# Patient Record
Sex: MALE | Race: OTHER | HISPANIC OR LATINO | URBAN - METROPOLITAN AREA
[De-identification: names, ages, dates, MRNs, and addresses within clinical notes are randomized per-mention and may not be internally consistent; named-entity substitution may affect disease eponyms.]

---
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
## 2023-05-05 NOTE — ED PROVIDER NOTE - QUALITY
Instructions: This plan will send the code FBSE to the PM system.  DO NOT or CHANGE the price. Detail Level: Simple Price (Do Not Change): 0.00 anxiety producing

## 2023-06-13 ENCOUNTER — EMERGENCY (EMERGENCY)
Facility: HOSPITAL | Age: 23
LOS: 1 days | Discharge: HOME | End: 2023-06-13
Attending: EMERGENCY MEDICINE
Payer: COMMERCIAL

## 2023-06-13 VITALS
HEART RATE: 86 BPM | HEIGHT: 66 IN | RESPIRATION RATE: 16 BRPM | OXYGEN SATURATION: 97 % | TEMPERATURE: 98 F | WEIGHT: 154.76 LBS | DIASTOLIC BLOOD PRESSURE: 61 MMHG | SYSTOLIC BLOOD PRESSURE: 112 MMHG

## 2023-06-13 PROCEDURE — 99284 EMERGENCY DEPT VISIT MOD MDM: CPT

## 2023-06-13 PROCEDURE — 90715 TDAP VACCINE 7 YRS/> IM: CPT

## 2023-06-13 PROCEDURE — 73140 X-RAY EXAM OF FINGER(S): CPT | Mod: 26,LT

## 2023-06-13 PROCEDURE — 99283 EMERGENCY DEPT VISIT LOW MDM: CPT | Mod: 25

## 2023-06-13 PROCEDURE — 90471 IMMUNIZATION ADMIN: CPT

## 2023-06-13 PROCEDURE — 73140 X-RAY EXAM OF FINGER(S): CPT

## 2023-06-13 RX ORDER — TETANUS TOXOID, REDUCED DIPHTHERIA TOXOID AND ACELLULAR PERTUSSIS VACCINE, ADSORBED 5; 2.5; 8; 8; 2.5 [IU]/.5ML; [IU]/.5ML; UG/.5ML; UG/.5ML; UG/.5ML
0.5 SUSPENSION INTRAMUSCULAR ONCE
Refills: 0 | Status: COMPLETED | OUTPATIENT
Start: 2023-06-13 | End: 2023-06-13

## 2023-06-13 RX ORDER — IBUPROFEN 200 MG
1 TABLET ORAL
Qty: 15 | Refills: 0
Start: 2023-06-13

## 2023-06-13 RX ADMIN — TETANUS TOXOID, REDUCED DIPHTHERIA TOXOID AND ACELLULAR PERTUSSIS VACCINE, ADSORBED 0.5 MILLILITER(S): 5; 2.5; 8; 8; 2.5 SUSPENSION INTRAMUSCULAR at 18:16

## 2023-06-13 RX ADMIN — Medication 1 TABLET(S): at 18:15

## 2023-06-13 NOTE — ED PROVIDER NOTE - NS ED ATTENDING STATEMENT MOD
This was a shared visit with the CLARISSA. I reviewed and verified the documentation and independently performed the documented:

## 2023-06-13 NOTE — ED PROVIDER NOTE - NPI NUMBER (FOR SYSADMIN USE ONLY) :
[[id number]] Hydroxyzine Pregnancy And Lactation Text: This medication is not safe during pregnancy and should not be taken. It is also excreted in breast milk and breast feeding isn't recommended.

## 2023-06-13 NOTE — ED ADULT NURSE NOTE - OBJECTIVE STATEMENT
patient presents to ED after dog bite, laceration to left and right thumb bleeding controlled offers no other complaints at this time.

## 2023-06-13 NOTE — ED PROVIDER NOTE - ATTENDING APP SHARED VISIT CONTRIBUTION OF CARE
Rivka HAYNESBA-4-evpt-old male presents with dog bite to the right thumb when he was trying to break up a dog fight between his dog and another dog and his own dog bit him in the right thumb.  Patient has status unknown and his dog is vaccinated.    Patient is alert well-appearing male, S1-S2 normal regular, bilateral clear breath sounds, abdomen soft nontender nondistended, neuro exam is alert oriented x3 no focal deficits, skin warm dry good turgor, right hand dog bite puncture wound on the distal phalanx and is able to flex and extend right thumb no involvement of the nail    Plan to treat him with antibiotics Augmentin supplement tetanus and we will do an x-ray to rule out any occult fracture.  X-ray shows no fracture and will discharge him with Augmentin

## 2023-06-13 NOTE — ED ADULT NURSE NOTE - NSFALLUNIVINTERV_ED_ALL_ED
Bed/Stretcher in lowest position, wheels locked, appropriate side rails in place/Call bell, personal items and telephone in reach/Instruct patient to call for assistance before getting out of bed/chair/stretcher/Non-slip footwear applied when patient is off stretcher/[place] to call system/Physically safe environment - no spills, clutter or unnecessary equipment/Purposeful proactive rounding/Room/bathroom lighting operational, light cord in reach

## 2023-06-13 NOTE — ED PROVIDER NOTE - PATIENT PORTAL LINK FT
You can access the FollowMyHealth Patient Portal offered by [facility] by registering at the following website: [email or web address]. By joining [employer]’s FollowMyHealth portal, you will also be able to view your health information using other applications (apps) compatible with our system.

## 2023-06-13 NOTE — ED PROVIDER NOTE - SKIN, MLM
Superficial laceration to left thumb dorsum and volar surface, right thumb superficial abrasions. Sensation nl b/l, strength intact b/l Superficial lacerations to left thumb dorsum and volar surface, right thumb superficial abrasions. Sensation nl b/l, strength intact b/l Superficial lacerations to left thumb dorsum and volar surface 1cm and 0.5cm accordingly  right thumb superficial abrasions. Sensation nl b/l, strength intact b/l

## 2023-06-13 NOTE — ED ADULT TRIAGE NOTE - CHIEF COMPLAINT QUOTE
Patient ambulated into ED with steady gait, Pt c/o dog bite to left thumb bleeding controlled, Dog is vaccinated

## 2023-06-13 NOTE — ED PROVIDER NOTE - CARE PROVIDER_API CALL
Lizet Hagen  Orthopaedic Surgery  [address] [place] [address]  Phone: [phone]  Fax: [phone]  Follow Up Time:

## 2023-06-13 NOTE — ED PROVIDER NOTE - NS ED ROS FT
General: no fever/chills, no weakness/dizziness   HEENT: no headache   Abd: no abd pain, no nausea/vomiting   Chest: no Chest pain, no palpitations   Pulm: no shortness of breath   Skin: (+) bite

## 2023-06-13 NOTE — ED PROVIDER NOTE - NSFOLLOWUPINSTRUCTIONS_ED_ALL_ED_FT
Alert and oriented, no focal deficits, no motor or sensory deficits. keep the area dry and clean   continue antibiotic as well   call and follow up with primary care within 1-2 days   follow up with hand specialist   Animal Bite, Adult    Animal bite wounds can be mild or serious. It is important to get medical treatment to prevent infection. Ask your doctor if you need treatment to prevent an infection that can spread from animals to humans (rabies).    Follow these instructions at home:      Wound care     Follow instructions from your doctor about how to take care of your wound. Make sure you:    Wash your hands with soap and water before you change your bandage (dressing). If you cannot use soap and water, use hand .  Change your bandage as told by your doctor.  Leave stitches (sutures), skin glue, or skin tape (adhesive) strips in place. They may need to stay in place for 2 weeks or longer. If tape strips get loose and curl up, you may trim the loose edges. Do not remove tape strips completely unless your doctor says it is okay.  Check your wound every day for signs of infection. Check for:    More redness, swelling, or pain.  More fluid or blood.  Warmth.  Pus or a bad smell.        Medicines    Take or apply over-the-counter and prescription medicines only as told by your doctor.  If you were prescribed an antibiotic, take or apply it as told by your doctor. Do not stop using the antibiotic even if your wound gets better.        General instructions     Keep the injured area raised (elevated) above the level of your heart while you are sitting or lying down.  If directed, put ice on the injured area.    Put ice in a plastic bag.  Place a towel between your skin and the bag.  Leave the ice on for 20 minutes, 2–3 times per day.  Keep all follow-up visits as told by your doctor. This is important.    Contact a doctor if:  You have more redness, swelling, or pain around your wound.  Your wound feels warm to the touch.  You have a fever or chills.  You have a general feeling of sickness (malaise).  You feel sick to your stomach (nauseous).  You throw up (vomit).  You have pain that does not get better.    Get help right away if:  You have a red streak going away from your wound.  You have any of these coming from your wound:    Non-clear fluid.  More blood.  Pus or a bad smell.  You have trouble moving your injured area.  You lose feeling (have numbness) or feel tingling anywhere on your body.    Summary  It is important to get the right medical treatment for animal bites. Treatment can help you to not get an infection. Ask your doctor if you need treatment to prevent an infection that can spread from animals to humans (rabies).  Check your wound every day for signs of infection, such as more redness or swelling instead of less.  If you have a red streak going away from your wound, get medical help right away.    ADDITIONAL NOTES AND INSTRUCTIONS    Please follow up with your Primary MD in 24-48 hr.  Seek immediate medical care for any new/worsening signs or symptoms.

## 2023-06-13 NOTE — ED PROVIDER NOTE - OBJECTIVE STATEMENT
23 year old male with no pmhx presents with injury secondary to dog bite 2 hours ago from  his 2 dogs from fighting with each other. Mom at bedside. He has injury to circumferential left thumb, he is unsure of possible puncture through. He also has superficial cuts to right thumb. He is unsure of his tetanus status. Dogs are vaccinated. He has not taken any pain medications yet. 23 year old male with no pmhx presents with injury secondary to dog bite 2 hours ago from  his 2 dogs from fighting with each other. Mom at bedside. He has injury to circumferential left thumb, he is unsure of possible puncture through. He also has superficial cuts to right thumb. He is unsure of his tetanus status. Dogs are fully vaccinated. He has not taken any pain medications yet, he feels pain is controlled.

## 2025-03-27 NOTE — ED PROVIDER NOTE - CPE EDP CARDIAC NORM
Quality 47: Advance Care Plan: Advance Care Planning discussed and documented; advance care plan or surrogate decision maker documented in the medical record. Detail Level: Detailed Quality 226: Preventive Care And Screening: Tobacco Use: Screening And Cessation Intervention: Patient screened for tobacco use and is an ex/non-smoker normal...